# Patient Record
Sex: MALE | Race: WHITE | NOT HISPANIC OR LATINO
[De-identification: names, ages, dates, MRNs, and addresses within clinical notes are randomized per-mention and may not be internally consistent; named-entity substitution may affect disease eponyms.]

---

## 2020-11-13 ENCOUNTER — TRANSCRIPTION ENCOUNTER (OUTPATIENT)
Age: 35
End: 2020-11-13

## 2022-09-06 ENCOUNTER — TRANSCRIPTION ENCOUNTER (OUTPATIENT)
Age: 37
End: 2022-09-06

## 2022-09-07 ENCOUNTER — TRANSCRIPTION ENCOUNTER (OUTPATIENT)
Age: 37
End: 2022-09-07

## 2022-09-09 ENCOUNTER — APPOINTMENT (OUTPATIENT)
Dept: NEUROLOGY | Facility: CLINIC | Age: 37
End: 2022-09-09

## 2022-09-09 ENCOUNTER — TRANSCRIPTION ENCOUNTER (OUTPATIENT)
Age: 37
End: 2022-09-09

## 2022-09-09 VITALS
BODY MASS INDEX: 33.37 KG/M2 | HEART RATE: 60 BPM | SYSTOLIC BLOOD PRESSURE: 137 MMHG | TEMPERATURE: 97.2 F | OXYGEN SATURATION: 97 % | DIASTOLIC BLOOD PRESSURE: 88 MMHG | HEIGHT: 74 IN | WEIGHT: 260 LBS

## 2022-09-09 DIAGNOSIS — E78.00 PURE HYPERCHOLESTEROLEMIA, UNSPECIFIED: ICD-10-CM

## 2022-09-09 DIAGNOSIS — I63.9 CEREBRAL INFARCTION, UNSPECIFIED: ICD-10-CM

## 2022-09-09 PROBLEM — Z00.00 ENCOUNTER FOR PREVENTIVE HEALTH EXAMINATION: Status: ACTIVE | Noted: 2022-09-09

## 2022-09-09 PROCEDURE — 99215 OFFICE O/P EST HI 40 MIN: CPT

## 2022-09-09 NOTE — DISCUSSION/SUMMARY
[FreeTextEntry1] : Patient is a 36 yo M with no significant PMH who presents to the ED with diplopia with both eyes open with associated dizziness.  MRI finding of recent midbrain stroke\par \par - bp goal: normotensive\par - hypercoag work up pending\par - cardiac event monitor (pt with distant hx of vsd), recent echo with no pfo\par - aspirin 81mg daily\par - plavix 75mg daily for 3 weeks\par - lipitor 80mg daily\par - follow up in 3 months\par \par

## 2022-09-09 NOTE — DATA REVIEWED
[de-identified] : MRI brain without contrast  9/60/2022\par \par Multiplanar and multiecho sequence imaging of brain obtained. The study\par performed on a 1.5 [employer] magnet.\par \par Comparison made to head CT and brain perfusion study 9/60/2022\par \par \par The midline sagittal structures including the pituitary, corpus callosum,\par pineal and craniocervical junction regions are unremarkable.\par \par The ventricles are within normal limits in size. There are a couple of small\par subcentimeter foci of T2/FLAIR hyperintensity within the right aspect of\par midbrain and right thalamus with associated restricted diffusion compatible\par with a recent infarcts. There is no associated hemorrhage. There is no\par hemorrhage. There are no abnormal extra-axial collections.\par \par The distal internal carotid and vertebral basilar artery flow-voids are\par intact. \par \par There is no pathologic marrow placement. The visualized orbits are\par unremarkable. There is mild sphenoid sinus mucosal disease.\par \par Impression: \par \par Small subcentimeter recent right aspect of midbrain and right thalamic lacunar\par infarcts.\par \par No evidence of hemorrhage. [de-identified] : CTA head and neck \par \par Thin helical axial sections through the head and neck obtained following the\par bolus intravenous administration of contrast. Rapid CTA blood vessel density\par map generated. The patient received 60 ml of Isovue 370. Coronal and sagittal\par reconstructed images provided.\par \par \par Comparison made to head CT \par \par CTA neck:\par \par Great vessels: The aortic arch is normal in caliber without evidence of\par aneurysm or dissection. The great vessels are patent with no evidence of\par occlusion, significant stenosis or dissection. \par \par Right carotid artery: The common and internal carotid arteries are patent from\par origin to skull base. There is no evidence of any significant stenosis,\par occlusion, or dissection of the common or internal carotid artery.\par \par Left carotid artery: The common carotid and internal arteries are patent from\par origin to skull base. There is no evidence of any significant stenosis,\par occlusion, or dissection of the common or internal carotid artery.\par \par Vertebral arteries: There is bilateral contrast patency of the vertebral\par arteries from origin to the basilar artery without evidence of any significant\par stenosis, occlusion, or dissection. \par \par Soft tissues: The airway is patent. There is prominence of the adenoids.\par \par Cervical spine: There is mild reversal of upper cervical lordosis. There is no\par acute fracture or subluxation. Upper thorax: Respiratory motion limits\par evaluation of possible lung disease.\par \par CTA head:\par \par Anterior circulation: There is contrast patency of the middle and anterior\par cerebral artery major vessels without occlusion/abrupt cut off or any\par significant stenosis.\par \par Posterior circulation: There is contrast patency of the basilar artery\par continuing into the posterior cerebral arteries without occlusion or\par significant stenosis. \par \par There is no evidence of intracranial aneurysm or vascular malformation.\par \par There are no intracranial enhancing lesions.\par \par \par IMPRESSION:\par \par No evidence of any significant stenosis or occlusion of carotid or vertebral\par arteries.\par \par No evidence of intracranial major vessel arterial occlusion or significant\par stenosis.

## 2022-09-09 NOTE — PHYSICAL EXAM
[FreeTextEntry1] : Neuro Exam\par MS: AAOx3, follows commands, good comprehension, fund of knowledge appears intact, no aphasia, no dysarthria\par CN:  PERRL, EOMI, peripheral vision intact, v1-v3 intact, hearing intact, no facial asymmetry, tongue & uvula midline, shoulder shrug equal strength\par Motor:  5/5 no drift, no rigidity, no abnormal atrophy\par Sensory: Lt/PP intact\par Coord: no tremors\par DTR: 2+\par \par \par nihss = 0, mRS = 0\par \par

## 2022-09-09 NOTE — HISTORY OF PRESENT ILLNESS
[FreeTextEntry1] : PT seen for hospital follow up:\par \par Patient is a 38 yo M with no significant PMH who presents to the ED with diplopia with both eyes open with associated dizziness. Pt seen at [facility] on 9/6/22: at the time, pt with minimal residual deficits, subtle diplopia NIHSS 1. no tpa given since pt arrived outside the window\par \par subsequent mri showed small midbrain stroke\par pt started on aspirin and plavix, statins, and discharged home\par \par 9/9/22: doing well. no deficits\par \par

## 2022-10-04 ENCOUNTER — NON-APPOINTMENT (OUTPATIENT)
Age: 37
End: 2022-10-04

## 2022-10-04 ENCOUNTER — APPOINTMENT (OUTPATIENT)
Dept: HEART AND VASCULAR | Facility: CLINIC | Age: 37
End: 2022-10-04

## 2022-10-04 VITALS
HEIGHT: 74 IN | HEART RATE: 67 BPM | WEIGHT: 260 LBS | SYSTOLIC BLOOD PRESSURE: 126 MMHG | OXYGEN SATURATION: 96 % | BODY MASS INDEX: 33.37 KG/M2 | DIASTOLIC BLOOD PRESSURE: 84 MMHG | TEMPERATURE: 98.1 F

## 2022-10-04 PROCEDURE — 93000 ELECTROCARDIOGRAM COMPLETE: CPT

## 2022-10-04 PROCEDURE — 99204 OFFICE O/P NEW MOD 45 MIN: CPT | Mod: 25

## 2022-10-05 NOTE — CARDIOLOGY SUMMARY
[de-identified] : 9/7/202\par CONCLUSIONS\par 1. Mild LV dilatation with overall preserved systolic function. Regional wall motion suboptimally seen. Normal diastolic function.\par 2. Left ventricular calculated ejection fraction is estimated at 55 to 60%.\par 3. Normal RV size and systolic function.\par 4. Trace tricuspid regurgitation.\par 5. Trivial pericardial effusion.\par 6. No obvious interatrial shunt, assessed by agitated IV saline at rest and with Valsalva, although images are suboptimal.\par 7. No prior available.\par 8. Consider NHI if there is clinical indication.

## 2022-10-05 NOTE — DISCUSSION/SUMMARY
[FreeTextEntry1] : 36 y/o M with PMHx of CVA (no residual deficit), HLD here for further evaluation\par \par # CVA\par Ordering Event monitor 2 weeks to look for arrhythmia\par Can consider Loop recorder\par Consider NHI if has recurrent episode of stroke\par Continue ASA\par \par # HLD\par Continue Statin [EKG obtained to assist in diagnosis and management of assessed problem(s)] : EKG obtained to assist in diagnosis and management of assessed problem(s)

## 2022-10-05 NOTE — HISTORY OF PRESENT ILLNESS
[FreeTextEntry1] : 36 y/o M with PMHx of CVA (no residual deficit), HLD here for further evaluation\par \par TTE 9/7/2022: EF 55-60%, no shunt with bubble study\par CTA Neck: No evidence of any significant stenosis or occlusion of carotid or vertebral arteries.\par EKG 10/4/2022: NSR, HR 68\par \par Unclear etiology of stroke. 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